# Patient Record
Sex: MALE | Race: AMERICAN INDIAN OR ALASKA NATIVE
[De-identification: names, ages, dates, MRNs, and addresses within clinical notes are randomized per-mention and may not be internally consistent; named-entity substitution may affect disease eponyms.]

---

## 2022-09-21 ENCOUNTER — HOSPITAL ENCOUNTER (OUTPATIENT)
Dept: HOSPITAL 5 - OR | Age: 67
Discharge: HOME | End: 2022-09-21
Attending: UROLOGY
Payer: MEDICARE

## 2022-09-21 VITALS — SYSTOLIC BLOOD PRESSURE: 132 MMHG | DIASTOLIC BLOOD PRESSURE: 76 MMHG

## 2022-09-21 DIAGNOSIS — Z98.890: ICD-10-CM

## 2022-09-21 DIAGNOSIS — I10: ICD-10-CM

## 2022-09-21 DIAGNOSIS — N13.30: Primary | ICD-10-CM

## 2022-09-21 DIAGNOSIS — Z90.49: ICD-10-CM

## 2022-09-21 DIAGNOSIS — Z79.899: ICD-10-CM

## 2022-09-21 DIAGNOSIS — E11.9: ICD-10-CM

## 2022-09-21 PROCEDURE — C2617 STENT, NON-COR, TEM W/O DEL: HCPCS

## 2022-09-21 PROCEDURE — C1769 GUIDE WIRE: HCPCS

## 2022-09-21 PROCEDURE — 74420 UROGRAPHY RTRGR +-KUB: CPT

## 2022-09-21 PROCEDURE — 82962 GLUCOSE BLOOD TEST: CPT

## 2022-09-21 PROCEDURE — 52332 CYSTOSCOPY AND TREATMENT: CPT

## 2022-09-21 PROCEDURE — C1758 CATHETER, URETERAL: HCPCS

## 2022-09-21 NOTE — FLUOROSCOPY REPORT
INTRAOPERATIVE FLUOROSCOPY: RETROGRADE UROGRAPHY



INDICATION:

BILAT RPG W/ RIGHT STENT PLACEMENT.



TECHNIQUE:

Intraoperative spot images were obtained during the procedure.



FINDINGS:

There appears to be narrowing of the distal right ureter. This could be related to peristalsis. No ab
normality is seen within the left ureter or left renal collecting system. Right ureteral stent is not
ed on the final image. Please see procedure note for details.



Fluoroscopy Time: 13 seconds. Fluoroscopy Images: 5.



Signer Name: Trip Turner MD 

Signed: 9/21/2022 2:11 PM

Workstation Name: [email or web address]

## 2022-09-21 NOTE — OPERATIVE REPORT
DATE OF SURGERY: 09/21/2022



PREOPERATIVE DIAGNOSIS:  Right hydronephrosis.



POSTOPERATIVE DIAGNOSIS:  Right hydronephrosis.



PROCEDURE PERFORMED:  Cystoscopy, bilateral retrograde pyelograms, right 

double-J stent placement (6-Jamaican 24 cm with a short internal string).



SURGEON:  Zach Summers MD



ANESTHESIA:  General.



ESTIMATED BLOOD LOSS:  Minimal.



FLUIDS:  Crystalloid.



COMPLICATIONS:  No complications.



INDICATIONS:  This patient is a 66-year-old male with a history of BPH and now 

right hydronephrosis due to a right sided mass.  He had previous appendectomy 

and now with back pain and this right mass on MRI.  He presents now for 

intervention.  Risks, benefits, and complications were explained.



DESCRIPTION OF PROCEDURE:  The patient was taken to the operative suite, placed 

in a supine position.  After adequate general anesthesia, placed in a dorsal 

lithotomy position, prepped and draped in a sterile fashion.  

Pancystourethroscopy was performed with a 22-Jamaican Storz cystoscope, no 

urethral abnormalities.  His prostate displayed mild trilobar obstruction.  

Bladder, no tumors or stones were noted.  Bilateral retrograde pyelograms were 

obtained with an 8-Jamaican [place] catheter and 8 mL of contrast.  No filling 

defects or obstruction on the left.  Right side, distal third to distal half of 

the ureter was narrowed with hydronephrosis above that moderately.  A 0.035 

Glidewire was placed.  A 6-Jamaican 24 cm double-J stent with a short internal 

string was left indwelling.  Bladder was drained.  Rectal exam was benign.  

Fluoroscopy confirmed adequate position.  He was extubated and taken to recovery

room.  Rectal exam was benign.  He will go home on Macrobid and Wellcoin.







DD: 09/21/2022 12:21 PM

DT: 09/21/2022 12:41 PM

TID: [id number] RECEIPT: [id number]

[facility]

## 2022-09-21 NOTE — ANESTHESIA CONSULTATION
Anesthesia Consult and Med Hx


Date of service: 09/21/22





- Airway


Anesthetic Teeth Evaluation: Chipped, Caps


ROM Head & Neck: Adequate


Mental/Hyoid Distance: Adequate


Mallampati Class: Class III


Intubation Access Assessment: Probably Good





- Pre-Operative Health Status


ASA Pre-Surgery Classification: ASA2


Proposed Anesthetic Plan: General





- Pulmonary


Hx Smoking: No


Hx Respiratory Symptoms: No (+2FS)


Hx Sleep Apnea: No (AMY PRE SCREEN HIGH RISK)





- Cardiovascular System


Hx Hypertension: Yes (X 3 YRS)





- Central Nervous System


Hx Back Pain: Yes


Hx Psychiatric Problems: No





- Gastrointestinal


Hx Gastroesophageal Reflux Disease: No





- Endocrine


Hx Renal Disease: Yes (Stones)


Hx Non-Insulin Dependent Diabetes: Yes (FBS 95)





- Hematic


Hx Anemia: No


Hx Sickle Cell Disease: No





- Other Systems


Hx Cancer: No


Hx Obesity: No